# Patient Record
(demographics unavailable — no encounter records)

---

## 2024-12-20 NOTE — PHYSICAL EXAM
[Non-Tender] : non-tender [Smooth] : smooth [General Appearance - Alert] : alert [General Appearance - In No Acute Distress] : in no acute distress [General Appearance - Well Nourished] : well nourished [General Appearance - Well Developed] : well developed [General Appearance - Well-Appearing] : healthy appearing [Sclera] : the sclera and conjunctiva were normal [Hearing Threshold Finger Rub Not [place]] : hearing was normal [Oropharynx] : the oropharynx was normal [Neck Appearance] : the appearance of the neck was normal [Neck Cervical Mass (___cm)] : no neck mass was observed [Jugular Venous Distention Increased] : there was no jugular-venous distention [Respiration, Rhythm And Depth] : normal respiratory rhythm and effort [Auscultation Breath Sounds / Voice Sounds] : lungs were clear to auscultation bilaterally [Exaggerated Use Of Accessory Muscles For Inspiration] : no accessory muscle use [Heart Rate And Rhythm] : heart rate was normal and rhythm regular [Heart Sounds] : normal S1 and S2 [Edema] : there was no peripheral edema [Bowel Sounds] : normal bowel sounds [Abdomen Soft] : soft [Abdomen Tenderness] : non-tender [Abdomen Mass (___ Cm)] : no abdominal mass palpated [Abnormal Walk] : normal gait [Nail Clubbing] : no clubbing  or cyanosis of the fingernails [Involuntary Movements] : no involuntary movements were seen [Skin Color & Pigmentation] : normal skin color and pigmentation [Skin Turgor] : normal skin turgor [] : no rash [Oriented To Time, Place, And Person] : oriented to person, place, and time [Impaired Insight] : insight and judgment were intact [Affect] : the affect was normal [Memory Recent] : recent memory was not impaired [Mood] : the mood was normal [Memory Remote] : remote memory was not impaired [Scleral Icterus] : No Scleral Icterus [Spider Angioma] : No spider angioma(s) were observed [Abdominal  Ascites] : no ascites [Splenomegaly] : no splenomegaly [Caput Medusae] : no caput medusae observed [Asterixis] : no asterixis observed [Jaundice] : No jaundice [Palmar Erythema] : no Palmar Erythema [FreeTextEntry1] : +2/6 systolic murmur heard best at [facility]

## 2024-12-20 NOTE — ASSESSMENT
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 - We discussed that the regression in his hepatic steatosis is likely related to healthy lifestyle changes he has made including, most notably, cutting down on his alcohol consumption significantly. We discussed that, despite the reassuring recent labs and imaging, he is still at risk of recurrent MetALD and that, in fact, being a heterozygous carrier for a HFE gene mutation may increase that risk even though he will not develop hemochromatosis. - He was encouraged to gradually lose weight, exercise, and adhere to a healthy, Mediterranean style diet. We also again discussed that, ideally, given his history of MetALD, alcohol abstinence is safest and recommended but that, in the absence of abstinence, I am at least happy that he has reportedly cut down on his alcohol to 2 standard drinks/week, far below his prior consumption levels. - I recommended that he have labs at least annually for surveillance and we will also plan for repeat FibroScan in 1 year.  # Pancreatic cysts: - Recent MRI abdomen (10/16/24) showed pancreatic cystic lesions up to 7 mm without suspicious radiologic features. - We will plan for repeat MRI abdomen in 1 year (10/2025) for surveillance.  # Hepatic hemangioma: - Recent MRI abdomen (10/16/24) showed a 2.2 cm left lateral segment (segment 3) hepatic hemangioma. - We discussed that hepatic hemangiomas are the most common benign liver lesion. Asymptomatic hemangiomas <=5 cm in size do not require further surveillance. Even for larger hemangiomas, prognosis is excellent as complications such as bleeding are very rare and malignant transformation does not occur.  # Health maintenance: - He is HAV non-immune. He recalls having an allergic reaction to Havrix vaccination after a dose given a few years ago including pruritic rash that necessitated a brief hospitalization. In light of the allergy, I recommended against re-attempting Havrix vaccinations. - He is HBV immune. - He underwent colonoscopy in 12/2024 with his GI Dr. Bay and will continue to follow with him for routine colon cancer screening, especially given his family history of colon cancer. - Mr. MARROQUIN was counseled to: abstain from alcohol (as above); avoid use of herbal and dietary supplements due to potential hepatotoxicity; and limit use of acetaminophen to <2 grams per day.  Next follow-up: 1 year with same-day FibroScan

## 2024-12-20 NOTE — PHYSICAL EXAM
[Non-Tender] : non-tender [Smooth] : smooth [General Appearance - Alert] : alert [General Appearance - In No Acute Distress] : in no acute distress [General Appearance - Well Nourished] : well nourished [General Appearance - Well Developed] : well developed [General Appearance - Well-Appearing] : healthy appearing [Sclera] : the sclera and conjunctiva were normal [Hearing Threshold Finger Rub Not [place]] : hearing was normal [Oropharynx] : the oropharynx was normal [Neck Appearance] : the appearance of the neck was normal [Neck Cervical Mass (___cm)] : no neck mass was observed [Jugular Venous Distention Increased] : there was no jugular-venous distention [Respiration, Rhythm And Depth] : normal respiratory rhythm and effort [Auscultation Breath Sounds / Voice Sounds] : lungs were clear to auscultation bilaterally [Exaggerated Use Of Accessory Muscles For Inspiration] : no accessory muscle use [Heart Rate And Rhythm] : heart rate was normal and rhythm regular [Heart Sounds] : normal S1 and S2 [Edema] : there was no peripheral edema [Abdomen Soft] : soft [Bowel Sounds] : normal bowel sounds [Abdomen Tenderness] : non-tender [Abdomen Mass (___ Cm)] : no abdominal mass palpated [Abnormal Walk] : normal gait [Nail Clubbing] : no clubbing  or cyanosis of the fingernails [Involuntary Movements] : no involuntary movements were seen [Skin Color & Pigmentation] : normal skin color and pigmentation [Skin Turgor] : normal skin turgor [] : no rash [Oriented To Time, Place, And Person] : oriented to person, place, and time [Impaired Insight] : insight and judgment were intact [Affect] : the affect was normal [Memory Recent] : recent memory was not impaired [Mood] : the mood was normal [Memory Remote] : remote memory was not impaired [Scleral Icterus] : No Scleral Icterus [Spider Angioma] : No spider angioma(s) were observed [Abdominal  Ascites] : no ascites [Splenomegaly] : no splenomegaly [Caput Medusae] : no caput medusae observed [Asterixis] : no asterixis observed [Jaundice] : No jaundice [Palmar Erythema] : no Palmar Erythema [FreeTextEntry1] : +2/6 systolic murmur heard best at [facility]

## 2024-12-20 NOTE — HISTORY OF PRESENT ILLNESS
[de-identified] : Mr. 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  PCP: Dr. Bairon Bermudez GI: Dr. Aravind Bay  Abdominal US (6/29/22): Hepatic steatosis.  FibroScan (7/26/22, [facility]): Median liver stiffness 3.9 kPa (normal, consistent with F0-1 fibrosis) and CAP score 314 dB/m (consistent with S3 steatosis).  Abdominal US (10/8/24): Hepatomegaly with a 2.3 cm hypoechoic left lobe lesion. Gallbladder sludge.  MRI abdomen with and without contrast (10/16/24): No hepatomegaly or steatosis. Left lateral segment (segment 3) 2.2 cm hemangioma. No suspicious hepatic lesion. Patent hepatic vasculature. No ascites. No splenomegaly. Pancreatic cystic lesions up to 7 mm without suspicious radiologic features. Small kidney cysts (up to 2.3 cm on left). Diverticulosis.  FibroScan (12/19/24): Median liver stiffness 6.4 kPa (normal, consistent with F0-1 fibrosis) and CAP score 219 dB/m (normal, consistent with S0 steatosis).  He was last seen by me on 9/26/24 and is here today for routine follow-up and FibroScan (above). He reports that, since our last visit, he completely cut out drinking vodka and also cut down on his other alcohol consumption, now only drinking 1-2 glasses of wine about once/week socially when out. He is no longer drinking alcohol at home. He is continuing to try to work on his diet and exercising. He underwent a colonoscopy last week with Dr. Bay and reports that it was fine with no polyps.  He has a family history of a paternal aunt with colon cancer. No known family history of liver disease.  He is  and lives with his wife, Shayna. They have 3 daughters (twin 35-year-old daughters including Daly who previously saw me for consultation in 2024 due to elevated liver enzymes during pregnancy, and a 41-year-old daughter). He is a former smoker but quit >30 years ago. He denies any history of IVDU or intranasal drug use.

## 2024-12-20 NOTE — HISTORY OF PRESENT ILLNESS
[de-identified] : Mr. 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  PCP: Dr. Bairon Bermudez GI: Dr. Aravind Bay  Abdominal US (6/29/22): Hepatic steatosis.  FibroScan (7/26/22, [facility]): Median liver stiffness 3.9 kPa (normal, consistent with F0-1 fibrosis) and CAP score 314 dB/m (consistent with S3 steatosis).  Abdominal US (10/8/24): Hepatomegaly with a 2.3 cm hypoechoic left lobe lesion. Gallbladder sludge.  MRI abdomen with and without contrast (10/16/24): No hepatomegaly or steatosis. Left lateral segment (segment 3) 2.2 cm hemangioma. No suspicious hepatic lesion. Patent hepatic vasculature. No ascites. No splenomegaly. Pancreatic cystic lesions up to 7 mm without suspicious radiologic features. Small kidney cysts (up to 2.3 cm on left). Diverticulosis.  FibroScan (12/19/24): Median liver stiffness 6.4 kPa (normal, consistent with F0-1 fibrosis) and CAP score 219 dB/m (normal, consistent with S0 steatosis).  He was last seen by me on 9/26/24 and is here today for routine follow-up and FibroScan (above). He reports that, since our last visit, he completely cut out drinking vodka and also cut down on his other alcohol consumption, now only drinking 1-2 glasses of wine about once/week socially when out. He is no longer drinking alcohol at home. He is continuing to try to work on his diet and exercising. He underwent a colonoscopy last week with Dr. Bay and reports that it was fine with no polyps.  He has a family history of a paternal aunt with colon cancer. No known family history of liver disease.  He is  and lives with his wife, Shayna. They have 3 daughters (twin 35-year-old daughters including Daly who previously saw me for consultation in 2024 due to elevated liver enzymes during pregnancy, and a 41-year-old daughter). He is a former smoker but quit >30 years ago. He denies any history of IVDU or intranasal drug use.